# Patient Record
Sex: MALE | URBAN - METROPOLITAN AREA
[De-identification: names, ages, dates, MRNs, and addresses within clinical notes are randomized per-mention and may not be internally consistent; named-entity substitution may affect disease eponyms.]

---

## 2021-04-22 ENCOUNTER — EMERGENCY (EMERGENCY)
Facility: HOSPITAL | Age: 26
LOS: 1 days | Discharge: HOME | End: 2021-04-22
Admitting: EMERGENCY MEDICINE
Payer: COMMERCIAL

## 2021-04-22 VITALS
OXYGEN SATURATION: 97 % | TEMPERATURE: 98 F | DIASTOLIC BLOOD PRESSURE: 81 MMHG | SYSTOLIC BLOOD PRESSURE: 132 MMHG | HEART RATE: 63 BPM | RESPIRATION RATE: 18 BRPM

## 2021-04-22 DIAGNOSIS — Z20.822 CONTACT WITH AND (SUSPECTED) EXPOSURE TO COVID-19: ICD-10-CM

## 2021-04-22 LAB — SARS-COV-2 RNA SPEC QL NAA+PROBE: SIGNIFICANT CHANGE UP

## 2021-04-22 PROCEDURE — U0003: CPT

## 2021-04-22 PROCEDURE — 99282 EMERGENCY DEPT VISIT SF MDM: CPT

## 2021-04-22 PROCEDURE — U0005: CPT

## 2021-04-22 PROCEDURE — 99283 EMERGENCY DEPT VISIT LOW MDM: CPT

## 2021-04-22 NOTE — ED PROVIDER NOTE - PATIENT PORTAL LINK FT
You can access the FollowMyHealth Patient Portal offered by [facility] by registering at the following website: [email or web address]. By joining [facility] [facility]’s FollowMyHealth portal, you will also be able to view your health information using other applications (apps) compatible with our system.

## 2024-02-12 NOTE — ED ADULT TRIAGE NOTE - PATIENT ON (OXYGEN DELIVERY METHOD)
Clinic hours for Nic Guzman:  Monday 7 am - 5 pm  Tuesday 7 am - 5 pm  Wednesday 7 am - 5 pm  Thursday 7 am - 5 pm  Friday  7 am - 4 pm    If you need a refill on your prescription please call your pharmacy and let them know. Please be proactive and call before your medication runs out. The pharmacy will then contact us for the refill. Please allow 24-48 hours for the refill to be processed.     If your physician has ordered additional laboratory or radiology testing as part of your ongoing plan of care, please allow 7-10 business days from the day of your lab draw or test for the results to be sent and reviewed by your provider. If your results are critical and require more immediate intervention, you will be contacted sooner. Your results will be conveyed to you via a phone call or letter.    You may be receiving a patient satisfaction survey in the mail. Please take the time to complete, as your feedback is very important to us. We strive to make your experience exceptional and your comments help us with that goal. We look forward to hearing from you.     room air